# Patient Record
Sex: FEMALE
[De-identification: names, ages, dates, MRNs, and addresses within clinical notes are randomized per-mention and may not be internally consistent; named-entity substitution may affect disease eponyms.]

---

## 2020-11-18 ENCOUNTER — HOSPITAL ENCOUNTER (EMERGENCY)
Dept: HOSPITAL 88 - FSED | Age: 40
Discharge: HOME | End: 2020-11-18
Payer: COMMERCIAL

## 2020-11-18 VITALS — BODY MASS INDEX: 24.49 KG/M2 | WEIGHT: 147 LBS | HEIGHT: 65 IN

## 2020-11-18 DIAGNOSIS — R07.9: Primary | ICD-10-CM

## 2020-11-18 DIAGNOSIS — F41.9: ICD-10-CM

## 2020-11-18 DIAGNOSIS — F32.9: ICD-10-CM

## 2020-11-18 PROCEDURE — 85379 FIBRIN DEGRADATION QUANT: CPT

## 2020-11-18 PROCEDURE — 84484 ASSAY OF TROPONIN QUANT: CPT

## 2020-11-18 PROCEDURE — 71046 X-RAY EXAM CHEST 2 VIEWS: CPT

## 2020-11-18 PROCEDURE — 80053 COMPREHEN METABOLIC PANEL: CPT

## 2020-11-18 PROCEDURE — 85025 COMPLETE CBC W/AUTO DIFF WBC: CPT

## 2020-11-18 PROCEDURE — 93005 ELECTROCARDIOGRAM TRACING: CPT

## 2020-11-18 PROCEDURE — 99284 EMERGENCY DEPT VISIT MOD MDM: CPT

## 2020-11-18 NOTE — DIAGNOSTIC IMAGING REPORT
EXAMINATION:  CXR 2 VIEW - [facility]    



INDICATION: Chest pain



COMPARISON:  None

     

FINDINGS:  



TUBES and LINES:  None.



LUNGS:  Normal lung volumes.  Lungs are clear.  No consolidations.



PLEURA:  No pleural effusion or pneumothorax.



HEART AND MEDIASTINUM:  The cardiomediastinal silhouette is unremarkable.    



BONES AND SOFT TISSUES:  No acute osseous lesion.  Soft tissues are

unremarkable.



UPPER ABDOMEN: No free air under the diaphragm.    



IMPRESSION:

 

No acute thoracic radiographic abnormality.





Signed by: Lorenzo Elliott MD on 11/18/2020 7:28 PM

## 2020-11-18 NOTE — EMERGENCY DEPARTMENT NOTE
History of Present Illnes


History of Present Illness


Chief Complaint:  Chest Pain


History of Present Illness


This is a 40 year old  female  Chief Complaint Comment chest pain interm

ittantly today, non radiating. no sob, no n/v. aaox4. ambulatory. no covid s/s. 

pt states mom recently  from heart realated illness and she is worried and 

under, "a lot of stress." pt is very pleasant and cooperative. non smoker. 


.


Historian:  Patient


Arrival Mode:  Car


Onset (how long ago):  day(s) (1)


Location:  left chest


Quality:  dull


Radiation:  Denies non-radiation, Denies back, Denies neck, Denies extremity, 

Denies abdomen, Denies periumbilical, Denies flank, Denies proximal, Denies 

distal, Denies other


Severity:  mild


Onset quality:  gradual


Duration (how long):  day(s) (1)


Timing of current episode:  intermittent


Progression:  waxing and waning


Chronicity:  new


Context:  Denies recent illness, Denies recent surgery, Denies recent 

immobilization, Denies recent travel, Denies trauma/injury, Denies new 

medications, Denies hx of DVT/PE, Denies non-compliance w/ medications, Denies 

other


Relieving factors:  none


Exacerbating factors:  none


Associated symptoms:  Reports denies other symptoms


Treatments prior to arrival:  none





Past Medical/Family History


Physician Review


I have reviewed the patient's past medical and family history.  Any updates have

been documented here.





Past Medical History


Recent Fever:  No


Clinical Suspicion of Infectio:  No


New/Unexplained Change in Ment:  No


Past Medical History:  Migraines, Anxiety, Depression


Past Surgical History:  


Other Surgery:  


13 yrs ago()





Social History


Smoking Cessation:  Never Smoker


Counseling Performed:  No


Alcohol Use:  None


Any Illegal Drug Use:  No


Physically hurt or threatened:  No





Other


Last Tetanus:  UNK


Any Pre-Existing Lines (PICC,:  No





Review of Systems


Review of Systems


Constitutional:  Reports no symptoms


EENTM:  Reports no symptoms


Cardiovascular:  Reports as per HPI


Respiratory:  Reports no symptoms


Gastrointestinal:  Reports no symptoms


Genitourinary:  Reports no symptoms


Musculoskeletal:  Reports no symptoms


Integumentary:  Reports no symptoms


Neurological:  Reports no symptoms


Psychological:  Reports no symptoms


Endocrine:  Reports no symptoms


Hematological/Lymphatic:  Reports no symptoms





Physical Exam


Related Data


Allergies:  


Coded Allergies:  


     No Known Allergies (Unverified , 10/14/14)


Triage Vital Signs





Vital Signs








  Date Time  Temp Pulse Resp B/P (MAP) Pulse Ox O2 Delivery O2 Flow Rate FiO2


 


20 18:31 98.5 77 16 141/70 100 Room Air  








Vital signs reviewed:  Yes





Physical Exam


CONSTITUTIONAL





Constitutional:  Present well-developed, Present well-nourished


HENT


HENT:  Present normocephalic, Present atraumatic, Present oropharynx 

clear/moist, Present nose normal


HENT L/R:  Present left ext ear normal, Present right ext ear normal


EYES





Eyes:  Reports PERRL, Reports conjunctivae normal; 


   Denies EOM normal, Denies lids normal, Denies left eye discharge, Denies 

right eye discharge, Denies scleral icterus, Denies other


NECK


Neck:  Present ROM normal; 


   Absent supple, Absent thyromegaly, Absent tracheal deviation, Absent stridor,

Absent JVD, Absent cervical adenopathy, Absent carotid bruit, Absent other


PULMONARY


Pulmonary:  Present effort normal, Present breath sounds normal; 


   Absent respiratory distress, Absent rales, Absent rhonchi, Absent chest 

tenderness, Absent other


CARDIOVASCULAR





Cardiovascular:  Present regular rhythm, Present heart sounds normal, Present 

capillary refill normal, Present normal rate


GASTROINTESTINAL





Abdominal:  Present soft, Present nontender, Present bowel sounds normal; 


   Absent distension, Absent tender, Absent guarding, Absent mass, Absent 

rebound, Absent hernia, Absent left CVA tenderness, Absent right CVA tenderness,

Absent other


GENITOURINARY





Genitourinary:  Present exam deferred; 


   Absent vagina normal, Absent uterus normal, Absent guaiac result, Absent 

vaginal discharge, Absent other


SKIN


Skin:  Present warm, Present dry; 


   Absent erythema, Absent pale, Absent rash, Absent jaundiced, Absent bruising,

Absent lesion, Absent other


MUSCULOSKELETAL





Musculoskeletal:  Present ROM normal


NEUROLOGICAL





Neurological:  Present alert, Present oriented x 3, Present no gross motor or 

sensory deficits


PSYCHOLOGICAL


Psychological:  Present mood/affect normal, Present judgement normal





Results


Laboratory


Lab results reviewed:  Yes





Imaging


Imaging results reviewed:  Yes





Procedures


12 Lead ECG Interpretation


ECG Interpretation :  


   ECG:  ECG 1


   Date:  2020


   Time:  18:22


   Prior ECG tracings:  reviewed


   Rhythm:  sinus rhythm


   Rate:  normal


   BPM:  76


   QRS axis:  normal


   ST segments normal:  Yes


   T waves normal:  Yes


   Pacin% capture





Assessment & Plan


Medical Decision Making


ALHAJI noriega PE GERD





Reassessment


Reassessment time:  19:35


Reassessment


BETTER





Assessment & Plan


Final Impression:  


(1) Chest pain


Depart Disposition:  HOME, SELF-CARE


Last Vital Signs











  Date Time  Temp Pulse Resp B/P (MAP) Pulse Ox O2 Delivery O2 Flow Rate FiO2


 


20 18:31 98.5 77 16 141/70 100 Room Air  








Home Meds


Active Scripts


Cyclobenzaprine Hcl (FLEXERIL) 5 Mg Tablet, 10 MG PO Q8H PRN for PAIN, #15 TAB 0

Refills


   Prov:ANNMARIE HOLLINGSWORTH MD         20











ANNMARIE HOLLINGSWORTH MD             2020 19:36